# Patient Record
Sex: FEMALE | Race: WHITE | HISPANIC OR LATINO | Employment: UNEMPLOYED | URBAN - METROPOLITAN AREA
[De-identification: names, ages, dates, MRNs, and addresses within clinical notes are randomized per-mention and may not be internally consistent; named-entity substitution may affect disease eponyms.]

---

## 2021-11-18 ENCOUNTER — HOSPITAL ENCOUNTER (EMERGENCY)
Facility: MEDICAL CENTER | Age: 36
End: 2021-11-18
Attending: EMERGENCY MEDICINE

## 2021-11-18 VITALS
WEIGHT: 115.96 LBS | RESPIRATION RATE: 18 BRPM | TEMPERATURE: 98.3 F | HEART RATE: 94 BPM | SYSTOLIC BLOOD PRESSURE: 104 MMHG | OXYGEN SATURATION: 97 % | DIASTOLIC BLOOD PRESSURE: 63 MMHG

## 2021-11-18 DIAGNOSIS — U07.1 COVID-19: ICD-10-CM

## 2021-11-18 LAB
B PARAP IS1001 DNA NPH QL NAA+NON-PROBE: NOT DETECTED
B PERT.PT PRMT NPH QL NAA+NON-PROBE: NOT DETECTED
C PNEUM DNA NPH QL NAA+NON-PROBE: NOT DETECTED
FLUAV RNA NPH QL NAA+NON-PROBE: NOT DETECTED
FLUBV RNA NPH QL NAA+NON-PROBE: NOT DETECTED
HADV DNA NPH QL NAA+NON-PROBE: NOT DETECTED
HCOV 229E RNA NPH QL NAA+NON-PROBE: NOT DETECTED
HCOV HKU1 RNA NPH QL NAA+NON-PROBE: NOT DETECTED
HCOV NL63 RNA NPH QL NAA+NON-PROBE: NOT DETECTED
HCOV OC43 RNA NPH QL NAA+NON-PROBE: NOT DETECTED
HMPV RNA NPH QL NAA+NON-PROBE: NOT DETECTED
HPIV1 RNA NPH QL NAA+NON-PROBE: NOT DETECTED
HPIV2 RNA NPH QL NAA+NON-PROBE: NOT DETECTED
HPIV3 RNA NPH QL NAA+NON-PROBE: NOT DETECTED
HPIV4 RNA NPH QL NAA+NON-PROBE: NOT DETECTED
M PNEUMO DNA NPH QL NAA+NON-PROBE: NOT DETECTED
RSV RNA NPH QL NAA+NON-PROBE: NOT DETECTED
RV+EV RNA NPH QL NAA+NON-PROBE: NOT DETECTED
SARS-COV-2 RNA NPH QL NAA+NON-PROBE: DETECTED

## 2021-11-18 PROCEDURE — A9270 NON-COVERED ITEM OR SERVICE: HCPCS | Performed by: EMERGENCY MEDICINE

## 2021-11-18 PROCEDURE — 87633 RESP VIRUS 12-25 TARGETS: CPT

## 2021-11-18 PROCEDURE — 99283 EMERGENCY DEPT VISIT LOW MDM: CPT

## 2021-11-18 PROCEDURE — 700102 HCHG RX REV CODE 250 W/ 637 OVERRIDE(OP): Performed by: EMERGENCY MEDICINE

## 2021-11-18 PROCEDURE — 87798 DETECT AGENT NOS DNA AMP: CPT

## 2021-11-18 PROCEDURE — 87581 M.PNEUMON DNA AMP PROBE: CPT

## 2021-11-18 PROCEDURE — 87486 CHLMYD PNEUM DNA AMP PROBE: CPT

## 2021-11-18 RX ORDER — DEXAMETHASONE 4 MG/1
6 TABLET ORAL ONCE
Status: COMPLETED | OUTPATIENT
Start: 2021-11-18 | End: 2021-11-18

## 2021-11-18 RX ORDER — DEXAMETHASONE 2 MG/1
6 TABLET ORAL DAILY
Qty: 27 TABLET | Refills: 0 | Status: SHIPPED | OUTPATIENT
Start: 2021-11-18 | End: 2021-11-27

## 2021-11-18 RX ADMIN — DEXAMETHASONE 6 MG: 4 TABLET ORAL at 12:28

## 2021-11-18 ASSESSMENT — ENCOUNTER SYMPTOMS
ABDOMINAL PAIN: 0
SHORTNESS OF BREATH: 1
FEVER: 1
MYALGIAS: 1
NAUSEA: 0
COUGH: 1
VOMITING: 0
HEADACHES: 1

## 2021-11-18 NOTE — ED TRIAGE NOTES
Chief Complaint   Patient presents with   • Coronavirus Screening     Pt reports having Covid x 12 days.  Over last 3 days she has developed a fever, cough, increasing shortness of breath with ambulation.       PT's RA saturation =91% upon assessment.  Placed in isolated waiting area.

## 2021-11-18 NOTE — ED PROVIDER NOTES
ED Provider Note    ED Provider Note    Primary care provider: No primary care provider on file.  Means of arrival: POV  History obtained from: patient  History limited by: Language Barrier. History/exam obtained through   # [id number]    CHIEF COMPLAINT  Chief Complaint   Patient presents with   • Coronavirus Screening     Pt reports having Covid x 12 days.  Over last 3 days she has developed a fever, cough, increasing shortness of breath with ambulation.         HPI Griselda Arellano is a 36 y.o. female who presents to the Emergency Department with chief complaints of suspicion that she has COVID-19.  Patient has headache fever, muscle pain, and the last few days she developed a cough and difficulty breathing.  She has no past medical history.  She is unvaccinated against COVID-19.  She is otherwise healthy.  She has been symptomatic for more than 12 days.    REVIEW OF SYSTEMS  Review of Systems   Constitutional: Positive for fever and malaise/fatigue.   HENT: Positive for congestion.    Respiratory: Positive for cough and shortness of breath.    Gastrointestinal: Negative for abdominal pain, nausea and vomiting.   Genitourinary: Negative for dysuria.   Musculoskeletal: Positive for myalgias.   Neurological: Positive for headaches.       PAST MEDICAL HISTORY   none reported    SURGICAL HISTORY  patient denies any surgical history    SOCIAL HISTORY  Social History     Tobacco Use   • Smoking status: Never Smoker   • Smokeless tobacco: Never Used   Substance Use Topics   • Alcohol use: Never   • Drug use: Never      Social History     Substance and Sexual Activity   Drug Use Never       FAMILY HISTORY  History reviewed. No pertinent family history.    CURRENT MEDICATIONS  Home Medications     Reviewed by Irma Carroll R.N. (Registered Nurse) on 11/18/21 at 1133  Med List Status: Not Addressed   Medication Last Dose Status        Patient Apolinar Taking any Medications                        ALLERGIES  No Known Allergies    PHYSICAL EXAM  VITAL SIGNS: /63   Pulse 94   Temp 36.8 °C (98.3 °F) (Temporal)   Resp 18   Wt 52.6 kg (115 lb 15.4 oz)   SpO2 97%   Vitals reviewed.  Constitutional: Patient is oriented to person, place, and time. Appears well-developed and well-nourished. Mild distress.    Head: Normocephalic and atraumatic.   Ears: Normal external ears bilaterally.   Mouth/Throat: Mask in place  Eyes: Conjunctivae are normal. Pupils are equal and round  Cardiovascular: Normal rate, regular rhythm and normal heart sounds.   Pulmonary/Chest: Effort normal and breath sounds normal. No respiratory distress, no wheezes, rhonchi, or rales.   Abdominal: Soft. Bowel sounds are normal. There is no tenderness.   Musculoskeletal: No edema.   Lymphadenopathy: No cervical adenopathy.   Neurological: No focal deficits.   Skin: Skin is warm and dry. No erythema. No pallor.   Psychiatric: Patient has a normal mood and affect.     COURSE & MEDICAL DECISION MAKING  Pertinent Labs & Imaging studies reviewed. (See chart for details)    No prior encounters in our EMR.    12:14 PM - Patient seen and examined at bedside. This is a previously healthy 36-year-old female.  She is unvaccinated against COVID-19.  Has symptoms suggestive of Covid.  She is swabbed here in the ED.  She is advised on how to obtain these results Which should be available in the next 24 hours.  She is not hypoxic or tachypneic.  There is no increased work of breathing.  She is outside of the window for monoclonal antibodies.  She continues to be symptomatic, she will be treated with steroids here in the emergency department and discharged with steroids.  She is advised to continue if  Covid testing is positive.  She is also advised on following CDC guidelines for returning to normal activity.     Vital signs normal.  Patient is discharged home in stable condition.    FINAL IMPRESSION  1. COVID-19     suspected

## 2021-11-18 NOTE — DISCHARGE INSTRUCTIONS
I suspect that she likely have COVID-19.  You were treated with steroids here in the emergency department.  Please follow-up on MyChart in the next 24 hours for Covid results.  If positive, continue steroids for the next 9 days, 10 days in total.  If worse, return to the emergency department for further evaluation.  Take steroids as directed.